# Patient Record
Sex: MALE | Race: WHITE | Employment: FULL TIME | URBAN - METROPOLITAN AREA
[De-identification: names, ages, dates, MRNs, and addresses within clinical notes are randomized per-mention and may not be internally consistent; named-entity substitution may affect disease eponyms.]

---

## 2017-04-20 ENCOUNTER — CLINICAL SUPPORT (OUTPATIENT)
Dept: CARDIOLOGY CLINIC | Age: 62
End: 2017-04-20

## 2017-04-20 DIAGNOSIS — Z02.89 EXAMINATION, PHYSICAL, EMPLOYEE: Primary | ICD-10-CM

## 2017-04-20 NOTE — PROGRESS NOTES
See scanned report. Shikha FLORES ordered study and Dr. Mica Reyes read study. ID verified per protocol.